# Patient Record
Sex: FEMALE | Race: WHITE | NOT HISPANIC OR LATINO | URBAN - METROPOLITAN AREA
[De-identification: names, ages, dates, MRNs, and addresses within clinical notes are randomized per-mention and may not be internally consistent; named-entity substitution may affect disease eponyms.]

---

## 2020-09-17 ENCOUNTER — EMERGENCY (EMERGENCY)
Facility: HOSPITAL | Age: 45
LOS: 0 days | Discharge: HOME | End: 2020-09-17
Attending: EMERGENCY MEDICINE
Payer: COMMERCIAL

## 2020-09-17 VITALS — WEIGHT: 139.99 LBS

## 2020-09-17 VITALS
RESPIRATION RATE: 17 BRPM | SYSTOLIC BLOOD PRESSURE: 111 MMHG | DIASTOLIC BLOOD PRESSURE: 78 MMHG | HEART RATE: 75 BPM | OXYGEN SATURATION: 100 %

## 2020-09-17 DIAGNOSIS — Z3A.19 19 WEEKS GESTATION OF PREGNANCY: ICD-10-CM

## 2020-09-17 DIAGNOSIS — R11.0 NAUSEA: ICD-10-CM

## 2020-09-17 DIAGNOSIS — O34.12 MATERNAL CARE FOR BENIGN TUMOR OF CORPUS UTERI, SECOND TRIMESTER: ICD-10-CM

## 2020-09-17 DIAGNOSIS — D25.9 LEIOMYOMA OF UTERUS, UNSPECIFIED: ICD-10-CM

## 2020-09-17 DIAGNOSIS — O20.0 THREATENED ABORTION: ICD-10-CM

## 2020-09-17 DIAGNOSIS — R10.9 UNSPECIFIED ABDOMINAL PAIN: ICD-10-CM

## 2020-09-17 LAB
ALBUMIN SERPL ELPH-MCNC: 3.4 G/DL — SIGNIFICANT CHANGE UP (ref 3.3–5)
ALP SERPL-CCNC: 54 U/L — SIGNIFICANT CHANGE UP (ref 40–120)
ALT FLD-CCNC: 19 U/L — SIGNIFICANT CHANGE UP (ref 12–78)
ANION GAP SERPL CALC-SCNC: 8 MMOL/L — SIGNIFICANT CHANGE UP (ref 5–17)
AST SERPL-CCNC: 12 U/L — LOW (ref 15–37)
BILIRUB SERPL-MCNC: 0.3 MG/DL — SIGNIFICANT CHANGE UP (ref 0.2–1.2)
BUN SERPL-MCNC: 11 MG/DL — SIGNIFICANT CHANGE UP (ref 7–23)
CALCIUM SERPL-MCNC: 9.1 MG/DL — SIGNIFICANT CHANGE UP (ref 8.5–10.1)
CHLORIDE SERPL-SCNC: 108 MMOL/L — SIGNIFICANT CHANGE UP (ref 96–108)
CO2 SERPL-SCNC: 23 MMOL/L — SIGNIFICANT CHANGE UP (ref 22–31)
CREAT SERPL-MCNC: 0.62 MG/DL — SIGNIFICANT CHANGE UP (ref 0.5–1.3)
GLUCOSE SERPL-MCNC: 77 MG/DL — SIGNIFICANT CHANGE UP (ref 70–99)
HCG SERPL-ACNC: HIGH MIU/ML
POTASSIUM SERPL-MCNC: 3.9 MMOL/L — SIGNIFICANT CHANGE UP (ref 3.5–5.3)
POTASSIUM SERPL-SCNC: 3.9 MMOL/L — SIGNIFICANT CHANGE UP (ref 3.5–5.3)
PROT SERPL-MCNC: 7.6 GM/DL — SIGNIFICANT CHANGE UP (ref 6–8.3)
SODIUM SERPL-SCNC: 139 MMOL/L — SIGNIFICANT CHANGE UP (ref 135–145)

## 2020-09-17 PROCEDURE — 85610 PROTHROMBIN TIME: CPT

## 2020-09-17 PROCEDURE — 81003 URINALYSIS AUTO W/O SCOPE: CPT

## 2020-09-17 PROCEDURE — 99284 EMERGENCY DEPT VISIT MOD MDM: CPT

## 2020-09-17 PROCEDURE — 85730 THROMBOPLASTIN TIME PARTIAL: CPT

## 2020-09-17 PROCEDURE — 80053 COMPREHEN METABOLIC PANEL: CPT

## 2020-09-17 PROCEDURE — 76805 OB US >/= 14 WKS SNGL FETUS: CPT | Mod: 26

## 2020-09-17 PROCEDURE — 85025 COMPLETE CBC W/AUTO DIFF WBC: CPT

## 2020-09-17 PROCEDURE — 36415 COLL VENOUS BLD VENIPUNCTURE: CPT

## 2020-09-17 PROCEDURE — 76805 OB US >/= 14 WKS SNGL FETUS: CPT

## 2020-09-17 PROCEDURE — 86901 BLOOD TYPING SEROLOGIC RH(D): CPT

## 2020-09-17 PROCEDURE — 84702 CHORIONIC GONADOTROPIN TEST: CPT

## 2020-09-17 PROCEDURE — 86900 BLOOD TYPING SEROLOGIC ABO: CPT

## 2020-09-17 PROCEDURE — 99284 EMERGENCY DEPT VISIT MOD MDM: CPT | Mod: 25

## 2020-09-17 PROCEDURE — 86850 RBC ANTIBODY SCREEN: CPT

## 2020-09-17 PROCEDURE — 87086 URINE CULTURE/COLONY COUNT: CPT

## 2020-09-17 NOTE — ED STATDOCS - NSFOLLOWUPINSTRUCTIONS_ED_ALL_ED_FT
[facility]                                                                      Threatened Miscarriage       A threatened miscarriage is when you have bleeding from your vagina during the first 20 weeks of pregnancy but the pregnancy does not end. Your doctor will do tests to make sure you are still pregnant. The cause of the bleeding may not be known. This condition does not mean your pregnancy will end, but it does increase the risk that it will end (complete miscarriage).      Follow these instructions at home:    •Get plenty of rest.      •If you have bleeding in your vagina, do not have sex or use tampons.      • Do not douche.      • Do not smoke or use drugs.      • Do not drink alcohol.      •Avoid caffeine.      •Keep all follow-up prenatal visits as told by your doctor. This is important.        Contact a doctor if:    •You have light bleeding from your vagina.      •You have belly pain or cramping.      •You have a fever.        Get help right away if:    •You have heavy bleeding from your vagina.      •You have clots of blood coming from your vagina.      •You pass tissue from your vagina.      •You have a gush of fluid from your vagina.      •You are leaking fluid from your vagina.      •You have very bad pain or cramps in your low back or belly.      •You have fever, chills, and very bad belly pain.        Summary    •A threatened miscarriage is when you have bleeding from your vagina during the first 20 weeks of pregnancy but the pregnancy does not end.      •This condition does not mean your pregnancy will end, but it does increase the risk that it will end (complete miscarriage).      •Get plenty of rest. If you have bleeding in your vagina, do not have sex or use tampons.      •Keep all follow-up prenatal visits as told by your doctor. This is important.      This information is not intended to replace advice given to you by your health care provider. Make sure you discuss any questions you have with your health care provider.      Document Released: 11/30/2009 Document Revised: 01/24/2019 Document Reviewed: 03/16/2018    Elsevier Patient Education © 2020 [employer] Inc[employer]

## 2020-09-17 NOTE — ED ADULT TRIAGE NOTE - CHIEF COMPLAINT QUOTE
pt c/o abdominal pain and nausea for the apst few days. pt states she did take a pregancy test this AM and it was positive. pt states last month she had a two day light period. pt ddenies fever fall tau vomit ting or diarrhea     pt states she did not plan or try to get pregnant

## 2020-09-17 NOTE — ED STATDOCS - PROGRESS NOTE DETAILS
Patient seen and evaluated in intake with ED Attending,  ED attending note and orders reviewed, will continue with patient follow up and care -Alexandrea Minor PA-C pt aware of US results and awaiting lab results, pt called her OB/GYN Dr. Clay to see her tomorrow. -Alexandrea Minor PA-C pt aware of US results and lab results, pt called her OB/GYN Dr. Clay to see her tomorrow. -Alexandrea Minor PA-C

## 2020-09-17 NOTE — ED ADULT NURSE NOTE - OBJECTIVE STATEMENT
pt. c/o umbilical pain, irregular periods and +pregnancy test x few days. pt. states hx of fibroid, last checked 7yrs ago. Pt. states periods are usually irregular in time and duration, but recently last two months periods have been short, only 2 days with lots of clots. Pt. denies dizziness, urinary symptoms, weakness, bleeding currently.

## 2020-09-17 NOTE — ED STATDOCS - CLINICAL SUMMARY MEDICAL DECISION MAKING FREE TEXT BOX
pt presented with c/c of abdominal pain and nausea took home preg test is positive. plan check labs, rh status. ua, us and fu with ob/gyn.

## 2020-09-17 NOTE — ED STATDOCS - CARE PLAN
Principal Discharge DX:	19 weeks gestation of pregnancy  Secondary Diagnosis:	Threatened miscarriage

## 2020-09-17 NOTE — ED STATDOCS - PATIENT PORTAL LINK FT
You can access the FollowMyHealth Patient Portal offered by [facility] by registering at the following website: [email or web address]. By joining [facility]’s FollowMyHealth portal, you will also be able to view your health information using other applications (apps) compatible with our system.

## 2020-09-17 NOTE — ED STATDOCS - CARE PROVIDER_API CALL
Kamala Vallejo)  Obstetrics and Gynecology  [address] [place] [address]  Phone: [phone]  Fax: [phone]  Follow Up Time:

## 2020-09-17 NOTE — ED STATDOCS - OBJECTIVE STATEMENT
44 y/o female with a PMHx of fibroids, h/o  presents to the ED c/o intermittent abd pain and nausea x days. Pt reports she took a pregnancy test yesterday, resulting positive. Pt reports having her period with larger clots than usual. Denies vomiting, constipation, diarrhea, urinary symptoms. No other complaints at this time.

## 2020-09-18 LAB
CULTURE RESULTS: SIGNIFICANT CHANGE UP
SPECIMEN SOURCE: SIGNIFICANT CHANGE UP

## 2020-09-21 PROBLEM — D21.9 BENIGN NEOPLASM OF CONNECTIVE AND OTHER SOFT TISSUE, UNSPECIFIED: Chronic | Status: ACTIVE | Noted: 2020-09-17

## 2020-09-24 ENCOUNTER — APPOINTMENT (OUTPATIENT)
Dept: OBGYN | Facility: CLINIC | Age: 45
End: 2020-09-24
Payer: COMMERCIAL

## 2020-09-24 VITALS
BODY MASS INDEX: 25.27 KG/M2 | HEIGHT: 64 IN | TEMPERATURE: 98.3 F | WEIGHT: 148 LBS | DIASTOLIC BLOOD PRESSURE: 80 MMHG | SYSTOLIC BLOOD PRESSURE: 120 MMHG

## 2020-09-24 DIAGNOSIS — N91.1 SECONDARY AMENORRHEA: ICD-10-CM

## 2020-09-24 DIAGNOSIS — F19.90 OTHER PSYCHOACTIVE SUBSTANCE USE, UNSPECIFIED, UNCOMPLICATED: ICD-10-CM

## 2020-09-24 DIAGNOSIS — Z80.8 FAMILY HISTORY OF MALIGNANT NEOPLASM OF OTHER ORGANS OR SYSTEMS: ICD-10-CM

## 2020-09-24 DIAGNOSIS — Z34.92 ENCOUNTER FOR SUPERVISION OF NORMAL PREGNANCY, UNSPECIFIED, SECOND TRIMESTER: ICD-10-CM

## 2020-09-24 DIAGNOSIS — Z78.9 OTHER SPECIFIED HEALTH STATUS: ICD-10-CM

## 2020-09-24 PROBLEM — Z00.00 ENCOUNTER FOR PREVENTIVE HEALTH EXAMINATION: Status: ACTIVE | Noted: 2020-09-24

## 2020-09-24 LAB
BILIRUB UR QL STRIP: NORMAL
CLARITY UR: CLEAR
COLLECTION METHOD: NORMAL
GLUCOSE UR-MCNC: NORMAL
HCG UR QL: 0.2 EU/DL
HGB UR QL STRIP.AUTO: NORMAL
KETONES UR-MCNC: NORMAL
LEUKOCYTE ESTERASE UR QL STRIP: NORMAL
NITRITE UR QL STRIP: NORMAL
PH UR STRIP: 6
PROT UR STRIP-MCNC: NORMAL
SP GR UR STRIP: 1.01

## 2020-09-24 PROCEDURE — 76816 OB US FOLLOW-UP PER FETUS: CPT

## 2020-09-24 PROCEDURE — 99204 OFFICE O/P NEW MOD 45 MIN: CPT

## 2020-09-24 PROCEDURE — 81003 URINALYSIS AUTO W/O SCOPE: CPT | Mod: QW

## 2020-09-24 NOTE — PHYSICAL EXAM
[Appropriately responsive] : appropriately responsive [Alert] : alert [No Acute Distress] : no acute distress [No Lymphadenopathy] : no lymphadenopathy [Regular Rate Rhythm] : regular rate rhythm [No Murmurs] : no murmurs [Clear to Auscultation B/L] : clear to auscultation bilaterally [Soft] : soft [Non-tender] : non-tender [Non-distended] : non-distended [No HSM] : No HSM [No Lesions] : no lesions [Oriented x3] : oriented x3 [FreeTextEntry7] : 20 week size uterus [Labia Majora] : normal [Labia Minora] : normal [Normal] : normal [Enlarged ___ wks] : enlarged [unfilled] ~Uweeks [Uterine Adnexae] : normal

## 2020-09-24 NOTE — COUNSELING
[Nutrition/ Exercise/ Weight Management] : nutrition, exercise, weight management [Pregnancy Options] : pregnancy options

## 2020-09-24 NOTE — PLAN
[FreeTextEntry1] : PT WITH ADVANCED MATERNAL AGE AND A 19 WEEK PREGNANCY. INFORMATION GIVEN. FINDINGS DISCUSSED. REVIEWED TESTING AND PRENATAL CARE OUTLINE

## 2020-09-24 NOTE — PROCEDURE
[Transabdominal OB Sonogram] : Transabdominal OB Sonogram [Intrauterine Pregnancy] : intrauterine pregnancy [Fetal Heart] : fetal heart present [Date: ___] : Date: [unfilled] [Current GA by Sonogram: ___ (wks)] : Current GA by Sonogram: [unfilled]Uwks [___ day(s)] : [unfilled] days [Transabdominal OB Sonogram WNL] : Transabdominal OB Sonogram WNL [FreeTextEntry1] : 19+ week gestation noted

## 2020-09-25 LAB
ABO + RH PNL BLD: NORMAL
APPEARANCE: CLEAR
BACTERIA: NEGATIVE
BASOPHILS # BLD AUTO: 0.02 K/UL
BASOPHILS NFR BLD AUTO: 0.3 %
BILIRUBIN URINE: NEGATIVE
BLD GP AB SCN SERPL QL: NORMAL
BLOOD URINE: NEGATIVE
COLOR: NORMAL
EOSINOPHIL # BLD AUTO: 0.16 K/UL
EOSINOPHIL NFR BLD AUTO: 2.1 %
GLUCOSE QUALITATIVE U: NEGATIVE
HCT VFR BLD CALC: 38 %
HGB A MFR BLD: 97.3 %
HGB A2 MFR BLD: 2.7 %
HGB BLD-MCNC: 12.8 G/DL
HGB FRACT BLD-IMP: NORMAL
HIV1+2 AB SPEC QL IA.RAPID: NONREACTIVE
HYALINE CASTS: 0 /LPF
IMM GRANULOCYTES NFR BLD AUTO: 0.1 %
KETONES URINE: ABNORMAL
LEUKOCYTE ESTERASE URINE: NEGATIVE
LYMPHOCYTES # BLD AUTO: 1.51 K/UL
LYMPHOCYTES NFR BLD AUTO: 20.2 %
MAN DIFF?: NORMAL
MCHC RBC-ENTMCNC: 32.7 PG
MCHC RBC-ENTMCNC: 33.7 GM/DL
MCV RBC AUTO: 97.2 FL
MICROSCOPIC-UA: NORMAL
MONOCYTES # BLD AUTO: 0.36 K/UL
MONOCYTES NFR BLD AUTO: 4.8 %
NEUTROPHILS # BLD AUTO: 5.4 K/UL
NEUTROPHILS NFR BLD AUTO: 72.5 %
NITRITE URINE: NEGATIVE
PH URINE: 6.5
PLATELET # BLD AUTO: 198 K/UL
PROTEIN URINE: NEGATIVE
RBC # BLD: 3.91 M/UL
RBC # FLD: 13.3 %
RED BLOOD CELLS URINE: 2 /HPF
SPECIFIC GRAVITY URINE: 1.01
SQUAMOUS EPITHELIAL CELLS: 2 /HPF
TSH SERPL-ACNC: 1.88 UIU/ML
UROBILINOGEN URINE: NORMAL
WBC # FLD AUTO: 7.46 K/UL
WHITE BLOOD CELLS URINE: 0 /HPF

## 2020-09-28 LAB
BACTERIA UR CULT: NORMAL
CMV IGG SERPL QL: <0.2 U/ML
CMV IGG SERPL-IMP: NEGATIVE
CMV IGM SERPL QL: <8 AU/ML
CMV IGM SERPL QL: NEGATIVE
HBV SURFACE AG SER QL: NONREACTIVE
HCV AB SER QL: NONREACTIVE
HCV S/CO RATIO: 0.13 S/CO
MEV IGG FLD QL IA: 53.9 AU/ML
MEV IGG+IGM SER-IMP: POSITIVE
RUBV IGG FLD-ACNC: 2.1 INDEX
RUBV IGG SER-IMP: POSITIVE
T GONDII AB SER-IMP: NEGATIVE
T GONDII AB SER-IMP: NEGATIVE
T GONDII IGG SER QL: <3 IU/ML
T GONDII IGM SER QL: <3 AU/ML
T PALLIDUM AB SER QL IA: NEGATIVE
VZV AB TITR SER: POSITIVE
VZV IGG SER IF-ACNC: 211.4 INDEX

## 2020-09-29 LAB
B19V IGG SER QL IA: 2.9 INDEX
B19V IGG+IGM SER-IMP: NORMAL
B19V IGG+IGM SER-IMP: POSITIVE
B19V IGM FLD-ACNC: 0.1 INDEX
B19V IGM SER-ACNC: NEGATIVE
RUBV IGM FLD-ACNC: 29.5 AU/ML

## 2020-10-01 LAB
AR GENE MUT ANL BLD/T: NORMAL
CFTR MUT TESTED BLD/T: NEGATIVE
CLARIM 15Q11.2: NORMAL
CLARIM 1P36: NORMAL
CLARIM 22Q11.2: NORMAL
CLARIM 4P-/WOLF-HIRSCHHORN: NORMAL
CLARIM 5P-/CRI DU CHAT: NORMAL
CLARIM ADDITIONAL INFO: NORMAL
CLARIM CHROMOSOME 13: NORMAL
CLARIM CHROMOSOME 18: NORMAL
CLARIM CHROMOSOME 21: NORMAL
CLARIM SEX CHROMOSOMES: NORMAL
CLARITEST NIPT W/MICRO: NORMAL
FMR1 GENE MUT ANL BLD/T: NORMAL

## 2020-10-05 ENCOUNTER — NON-APPOINTMENT (OUTPATIENT)
Age: 45
End: 2020-10-05

## 2020-10-05 ENCOUNTER — APPOINTMENT (OUTPATIENT)
Dept: ANTEPARTUM | Facility: CLINIC | Age: 45
End: 2020-10-05
Payer: COMMERCIAL

## 2020-10-05 ENCOUNTER — APPOINTMENT (OUTPATIENT)
Dept: OBGYN | Facility: CLINIC | Age: 45
End: 2020-10-05
Payer: COMMERCIAL

## 2020-10-05 ENCOUNTER — ASOB RESULT (OUTPATIENT)
Age: 45
End: 2020-10-05

## 2020-10-05 VITALS
SYSTOLIC BLOOD PRESSURE: 104 MMHG | WEIGHT: 152 LBS | HEIGHT: 64 IN | DIASTOLIC BLOOD PRESSURE: 62 MMHG | RESPIRATION RATE: 16 BRPM | TEMPERATURE: 97 F | BODY MASS INDEX: 25.95 KG/M2

## 2020-10-05 DIAGNOSIS — Z3A.21 21 WEEKS GESTATION OF PREGNANCY: ICD-10-CM

## 2020-10-05 PROCEDURE — 76811 OB US DETAILED SNGL FETUS: CPT

## 2020-10-05 PROCEDURE — 0502F SUBSEQUENT PRENATAL CARE: CPT

## 2020-11-02 ENCOUNTER — APPOINTMENT (OUTPATIENT)
Dept: OBGYN | Facility: CLINIC | Age: 45
End: 2020-11-02
Payer: COMMERCIAL

## 2020-11-02 ENCOUNTER — NON-APPOINTMENT (OUTPATIENT)
Age: 45
End: 2020-11-02

## 2020-11-02 VITALS
HEIGHT: 64 IN | RESPIRATION RATE: 16 BRPM | DIASTOLIC BLOOD PRESSURE: 68 MMHG | WEIGHT: 157 LBS | TEMPERATURE: 97.6 F | SYSTOLIC BLOOD PRESSURE: 108 MMHG | BODY MASS INDEX: 26.8 KG/M2

## 2020-11-02 DIAGNOSIS — Z3A.25 25 WEEKS GESTATION OF PREGNANCY: ICD-10-CM

## 2020-11-02 LAB
BILIRUB UR QL STRIP: NORMAL
CLARITY UR: CLEAR
COLLECTION METHOD: NORMAL
GLUCOSE UR-MCNC: NORMAL
HCG UR QL: 0.2 EU/DL
HGB UR QL STRIP.AUTO: NORMAL
KETONES UR-MCNC: NORMAL
LEUKOCYTE ESTERASE UR QL STRIP: NORMAL
NITRITE UR QL STRIP: NORMAL
PH UR STRIP: 6
PROT UR STRIP-MCNC: NORMAL
SP GR UR STRIP: 1.02

## 2020-11-02 PROCEDURE — 99072 ADDL SUPL MATRL&STAF TM PHE: CPT

## 2020-11-02 PROCEDURE — 90686 IIV4 VACC NO PRSV 0.5 ML IM: CPT

## 2020-11-02 PROCEDURE — 90471 IMMUNIZATION ADMIN: CPT

## 2020-11-02 PROCEDURE — 81003 URINALYSIS AUTO W/O SCOPE: CPT | Mod: QW

## 2020-11-02 PROCEDURE — 0502F SUBSEQUENT PRENATAL CARE: CPT | Mod: 25

## 2020-11-03 LAB
BASOPHILS # BLD AUTO: 0.03 K/UL
BASOPHILS NFR BLD AUTO: 0.4 %
EOSINOPHIL # BLD AUTO: 0.22 K/UL
EOSINOPHIL NFR BLD AUTO: 2.9 %
GLUCOSE 1H P 50 G GLC PO SERPL-MCNC: 129 MG/DL
HCT VFR BLD CALC: 38.5 %
HGB BLD-MCNC: 12.1 G/DL
IMM GRANULOCYTES NFR BLD AUTO: 0.3 %
LYMPHOCYTES # BLD AUTO: 1.59 K/UL
LYMPHOCYTES NFR BLD AUTO: 21.1 %
MAN DIFF?: NORMAL
MCHC RBC-ENTMCNC: 31.1 PG
MCHC RBC-ENTMCNC: 31.4 GM/DL
MCV RBC AUTO: 99 FL
MONOCYTES # BLD AUTO: 0.42 K/UL
MONOCYTES NFR BLD AUTO: 5.6 %
NEUTROPHILS # BLD AUTO: 5.25 K/UL
NEUTROPHILS NFR BLD AUTO: 69.7 %
PLATELET # BLD AUTO: 270 K/UL
RBC # BLD: 3.89 M/UL
RBC # FLD: 12.7 %
SARS-COV-2 IGG SERPL IA-ACNC: 0.08 INDEX
SARS-COV-2 IGG SERPL QL IA: NEGATIVE
WBC # FLD AUTO: 7.53 K/UL

## 2020-12-17 ENCOUNTER — NON-APPOINTMENT (OUTPATIENT)
Age: 45
End: 2020-12-17

## 2020-12-17 ENCOUNTER — APPOINTMENT (OUTPATIENT)
Dept: OBGYN | Facility: CLINIC | Age: 45
End: 2020-12-17
Payer: COMMERCIAL

## 2020-12-17 VITALS
SYSTOLIC BLOOD PRESSURE: 110 MMHG | BODY MASS INDEX: 28.17 KG/M2 | WEIGHT: 165 LBS | HEIGHT: 64 IN | DIASTOLIC BLOOD PRESSURE: 68 MMHG

## 2020-12-17 DIAGNOSIS — Z3A.31 31 WEEKS GESTATION OF PREGNANCY: ICD-10-CM

## 2020-12-17 LAB
BILIRUB UR QL STRIP: NORMAL
CLARITY UR: CLEAR
COLLECTION METHOD: NORMAL
GLUCOSE UR-MCNC: NORMAL
HCG UR QL: 0.2 EU/DL
HGB UR QL STRIP.AUTO: NORMAL
KETONES UR-MCNC: NORMAL
LEUKOCYTE ESTERASE UR QL STRIP: NORMAL
NITRITE UR QL STRIP: NORMAL
PH UR STRIP: 69
PROT UR STRIP-MCNC: NORMAL
SP GR UR STRIP: 1.02

## 2020-12-17 PROCEDURE — 0502F SUBSEQUENT PRENATAL CARE: CPT

## 2021-01-08 ENCOUNTER — APPOINTMENT (OUTPATIENT)
Dept: OBGYN | Facility: CLINIC | Age: 46
End: 2021-01-08
Payer: COMMERCIAL

## 2021-01-08 ENCOUNTER — NON-APPOINTMENT (OUTPATIENT)
Age: 46
End: 2021-01-08

## 2021-01-08 ENCOUNTER — APPOINTMENT (OUTPATIENT)
Dept: ANTEPARTUM | Facility: CLINIC | Age: 46
End: 2021-01-08
Payer: COMMERCIAL

## 2021-01-08 ENCOUNTER — ASOB RESULT (OUTPATIENT)
Age: 46
End: 2021-01-08

## 2021-01-08 VITALS
HEIGHT: 64 IN | RESPIRATION RATE: 16 BRPM | TEMPERATURE: 96.7 F | DIASTOLIC BLOOD PRESSURE: 64 MMHG | BODY MASS INDEX: 29.19 KG/M2 | WEIGHT: 171 LBS | SYSTOLIC BLOOD PRESSURE: 104 MMHG

## 2021-01-08 DIAGNOSIS — Z3A.34 34 WEEKS GESTATION OF PREGNANCY: ICD-10-CM

## 2021-01-08 LAB
BILIRUB UR QL STRIP: NORMAL
CLARITY UR: CLEAR
COLLECTION METHOD: NORMAL
GLUCOSE UR-MCNC: 100
HCG UR QL: 0.2 EU/DL
HGB UR QL STRIP.AUTO: NORMAL
KETONES UR-MCNC: NORMAL
LEUKOCYTE ESTERASE UR QL STRIP: NORMAL
NITRITE UR QL STRIP: NORMAL
PH UR STRIP: 6
PROT UR STRIP-MCNC: 30
SP GR UR STRIP: 1.03

## 2021-01-08 PROCEDURE — 99072 ADDL SUPL MATRL&STAF TM PHE: CPT

## 2021-01-08 PROCEDURE — 76819 FETAL BIOPHYS PROFIL W/O NST: CPT

## 2021-01-08 PROCEDURE — 81003 URINALYSIS AUTO W/O SCOPE: CPT | Mod: QW

## 2021-01-08 PROCEDURE — 0502F SUBSEQUENT PRENATAL CARE: CPT

## 2021-01-08 PROCEDURE — 90715 TDAP VACCINE 7 YRS/> IM: CPT

## 2021-01-08 PROCEDURE — 76816 OB US FOLLOW-UP PER FETUS: CPT

## 2021-01-08 PROCEDURE — 90471 IMMUNIZATION ADMIN: CPT

## 2021-01-08 PROCEDURE — 59025 FETAL NON-STRESS TEST: CPT

## 2021-01-08 PROCEDURE — 76821 MIDDLE CEREBRAL ARTERY ECHO: CPT | Mod: 59

## 2021-01-15 ENCOUNTER — ASOB RESULT (OUTPATIENT)
Age: 46
End: 2021-01-15

## 2021-01-15 ENCOUNTER — APPOINTMENT (OUTPATIENT)
Dept: OBGYN | Facility: CLINIC | Age: 46
End: 2021-01-15
Payer: COMMERCIAL

## 2021-01-15 ENCOUNTER — APPOINTMENT (OUTPATIENT)
Dept: ANTEPARTUM | Facility: CLINIC | Age: 46
End: 2021-01-15
Payer: COMMERCIAL

## 2021-01-15 ENCOUNTER — NON-APPOINTMENT (OUTPATIENT)
Age: 46
End: 2021-01-15

## 2021-01-15 VITALS
RESPIRATION RATE: 18 BRPM | OXYGEN SATURATION: 98 % | BODY MASS INDEX: 29.02 KG/M2 | DIASTOLIC BLOOD PRESSURE: 72 MMHG | HEIGHT: 64 IN | WEIGHT: 170 LBS | SYSTOLIC BLOOD PRESSURE: 108 MMHG

## 2021-01-15 DIAGNOSIS — Z34.93 ENCOUNTER FOR SUPERVISION OF NORMAL PREGNANCY, UNSPECIFIED, THIRD TRIMESTER: ICD-10-CM

## 2021-01-15 DIAGNOSIS — Z3A.35 35 WEEKS GESTATION OF PREGNANCY: ICD-10-CM

## 2021-01-15 DIAGNOSIS — O40.9XX0 POLYHYDRAMNIOS, UNSPECIFIED TRIMESTER, NOT APPLICABLE OR UNSPECIFIED: ICD-10-CM

## 2021-01-15 PROCEDURE — 59025 FETAL NON-STRESS TEST: CPT

## 2021-01-15 PROCEDURE — 36415 COLL VENOUS BLD VENIPUNCTURE: CPT

## 2021-01-15 PROCEDURE — 76821 MIDDLE CEREBRAL ARTERY ECHO: CPT | Mod: 59

## 2021-01-15 PROCEDURE — 76819 FETAL BIOPHYS PROFIL W/O NST: CPT

## 2021-01-15 PROCEDURE — 99072 ADDL SUPL MATRL&STAF TM PHE: CPT

## 2021-01-15 PROCEDURE — 0502F SUBSEQUENT PRENATAL CARE: CPT

## 2021-01-22 ENCOUNTER — NON-APPOINTMENT (OUTPATIENT)
Age: 46
End: 2021-01-22

## 2021-01-22 ENCOUNTER — APPOINTMENT (OUTPATIENT)
Dept: ANTEPARTUM | Facility: CLINIC | Age: 46
End: 2021-01-22
Payer: COMMERCIAL

## 2021-01-22 ENCOUNTER — APPOINTMENT (OUTPATIENT)
Dept: OBGYN | Facility: CLINIC | Age: 46
End: 2021-01-22
Payer: COMMERCIAL

## 2021-01-22 ENCOUNTER — ASOB RESULT (OUTPATIENT)
Age: 46
End: 2021-01-22

## 2021-01-22 VITALS
HEART RATE: 70 BPM | SYSTOLIC BLOOD PRESSURE: 106 MMHG | RESPIRATION RATE: 16 BRPM | DIASTOLIC BLOOD PRESSURE: 66 MMHG | WEIGHT: 170 LBS | OXYGEN SATURATION: 98 % | BODY MASS INDEX: 29.02 KG/M2 | HEIGHT: 64 IN

## 2021-01-22 DIAGNOSIS — Z3A.36 36 WEEKS GESTATION OF PREGNANCY: ICD-10-CM

## 2021-01-22 LAB
BASOPHILS # BLD AUTO: 0.01 K/UL
BASOPHILS NFR BLD AUTO: 0.1 %
EOSINOPHIL # BLD AUTO: 0.08 K/UL
EOSINOPHIL NFR BLD AUTO: 1 %
GP B STREP DNA SPEC QL NAA+PROBE: DETECTED
GP B STREP DNA SPEC QL NAA+PROBE: NORMAL
HCT VFR BLD CALC: 34 %
HGB BLD-MCNC: 10.4 G/DL
HIV1+2 AB SPEC QL IA.RAPID: NONREACTIVE
IMM GRANULOCYTES NFR BLD AUTO: 0.5 %
LYMPHOCYTES # BLD AUTO: 1.47 K/UL
LYMPHOCYTES NFR BLD AUTO: 17.8 %
MAN DIFF?: NORMAL
MCHC RBC-ENTMCNC: 27.7 PG
MCHC RBC-ENTMCNC: 30.6 GM/DL
MCV RBC AUTO: 90.4 FL
MONOCYTES # BLD AUTO: 0.53 K/UL
MONOCYTES NFR BLD AUTO: 6.4 %
NEUTROPHILS # BLD AUTO: 6.12 K/UL
NEUTROPHILS NFR BLD AUTO: 74.2 %
PLATELET # BLD AUTO: 200 K/UL
RBC # BLD: 3.76 M/UL
RBC # FLD: 13.4 %
SOURCE GBS: NORMAL
WBC # FLD AUTO: 8.25 K/UL

## 2021-01-22 PROCEDURE — 76819 FETAL BIOPHYS PROFIL W/O NST: CPT

## 2021-01-22 PROCEDURE — 99072 ADDL SUPL MATRL&STAF TM PHE: CPT

## 2021-01-22 PROCEDURE — 0502F SUBSEQUENT PRENATAL CARE: CPT

## 2021-01-22 PROCEDURE — 59025 FETAL NON-STRESS TEST: CPT

## 2021-02-01 ENCOUNTER — APPOINTMENT (OUTPATIENT)
Dept: ANTEPARTUM | Facility: CLINIC | Age: 46
End: 2021-02-01

## 2021-02-01 ENCOUNTER — APPOINTMENT (OUTPATIENT)
Dept: OBGYN | Facility: CLINIC | Age: 46
End: 2021-02-01

## 2021-02-04 ENCOUNTER — APPOINTMENT (OUTPATIENT)
Dept: OBGYN | Facility: CLINIC | Age: 46
End: 2021-02-04
Payer: COMMERCIAL

## 2021-02-04 ENCOUNTER — NON-APPOINTMENT (OUTPATIENT)
Age: 46
End: 2021-02-04

## 2021-02-04 VITALS — BODY MASS INDEX: 30.22 KG/M2 | RESPIRATION RATE: 16 BRPM | WEIGHT: 177 LBS | HEIGHT: 64 IN | TEMPERATURE: 97.4 F

## 2021-02-04 DIAGNOSIS — Z3A.38 38 WEEKS GESTATION OF PREGNANCY: ICD-10-CM

## 2021-02-04 PROCEDURE — 59025 FETAL NON-STRESS TEST: CPT

## 2021-02-04 PROCEDURE — 0502F SUBSEQUENT PRENATAL CARE: CPT

## 2021-02-05 DIAGNOSIS — Z01.818 ENCOUNTER FOR OTHER PREPROCEDURAL EXAMINATION: ICD-10-CM

## 2021-02-06 ENCOUNTER — APPOINTMENT (OUTPATIENT)
Dept: DISASTER EMERGENCY | Facility: CLINIC | Age: 46
End: 2021-02-06

## 2021-02-06 LAB — SARS-COV-2 N GENE NPH QL NAA+PROBE: NOT DETECTED

## 2021-02-07 ENCOUNTER — INPATIENT (INPATIENT)
Facility: HOSPITAL | Age: 46
LOS: 3 days | Discharge: HOME | End: 2021-02-11
Attending: OBSTETRICS & GYNECOLOGY | Admitting: OBSTETRICS & GYNECOLOGY
Payer: COMMERCIAL

## 2021-02-07 ENCOUNTER — RESULT REVIEW (OUTPATIENT)
Age: 46
End: 2021-02-07

## 2021-02-07 VITALS — WEIGHT: 176.37 LBS | HEIGHT: 64 IN

## 2021-02-07 DIAGNOSIS — O26.899 OTHER SPECIFIED PREGNANCY RELATED CONDITIONS, UNSPECIFIED TRIMESTER: ICD-10-CM

## 2021-02-07 LAB
AMNISURE ROM (RUPTURE OF MEMBRANES): NEGATIVE — SIGNIFICANT CHANGE UP
BASOPHILS # BLD AUTO: 0.02 K/UL — SIGNIFICANT CHANGE UP (ref 0–0.2)
BASOPHILS NFR BLD AUTO: 0.2 % — SIGNIFICANT CHANGE UP (ref 0–2)
EOSINOPHIL # BLD AUTO: 0.07 K/UL — SIGNIFICANT CHANGE UP (ref 0–0.5)
EOSINOPHIL NFR BLD AUTO: 0.7 % — SIGNIFICANT CHANGE UP (ref 0–6)
HCT VFR BLD CALC: 32.6 % — LOW (ref 34.5–45)
HGB BLD-MCNC: 10.5 G/DL — LOW (ref 11.5–15.5)
IMM GRANULOCYTES NFR BLD AUTO: 0.5 % — SIGNIFICANT CHANGE UP (ref 0–1.5)
LYMPHOCYTES # BLD AUTO: 1.71 K/UL — SIGNIFICANT CHANGE UP (ref 1–3.3)
LYMPHOCYTES # BLD AUTO: 17.6 % — SIGNIFICANT CHANGE UP (ref 13–44)
MCHC RBC-ENTMCNC: 26.3 PG — LOW (ref 27–34)
MCHC RBC-ENTMCNC: 32.2 GM/DL — SIGNIFICANT CHANGE UP (ref 32–36)
MCV RBC AUTO: 81.5 FL — SIGNIFICANT CHANGE UP (ref 80–100)
MONOCYTES # BLD AUTO: 0.61 K/UL — SIGNIFICANT CHANGE UP (ref 0–0.9)
MONOCYTES NFR BLD AUTO: 6.3 % — SIGNIFICANT CHANGE UP (ref 2–14)
NEUTROPHILS # BLD AUTO: 7.25 K/UL — SIGNIFICANT CHANGE UP (ref 1.8–7.4)
NEUTROPHILS NFR BLD AUTO: 74.7 % — SIGNIFICANT CHANGE UP (ref 43–77)
PLATELET # BLD AUTO: 200 K/UL — SIGNIFICANT CHANGE UP (ref 150–400)
RBC # BLD: 4 M/UL — SIGNIFICANT CHANGE UP (ref 3.8–5.2)
RBC # FLD: 14.2 % — SIGNIFICANT CHANGE UP (ref 10.3–14.5)
T PALLIDUM AB TITR SER: NEGATIVE — SIGNIFICANT CHANGE UP
WBC # BLD: 9.71 K/UL — SIGNIFICANT CHANGE UP (ref 3.8–10.5)
WBC # FLD AUTO: 9.71 K/UL — SIGNIFICANT CHANGE UP (ref 3.8–10.5)

## 2021-02-07 PROCEDURE — 59510 CESAREAN DELIVERY: CPT

## 2021-02-07 PROCEDURE — 85025 COMPLETE CBC W/AUTO DIFF WBC: CPT

## 2021-02-07 PROCEDURE — 86850 RBC ANTIBODY SCREEN: CPT

## 2021-02-07 PROCEDURE — 58611 LIGATE OVIDUCT(S) ADD-ON: CPT

## 2021-02-07 PROCEDURE — 59050 FETAL MONITOR W/REPORT: CPT

## 2021-02-07 PROCEDURE — 36415 COLL VENOUS BLD VENIPUNCTURE: CPT

## 2021-02-07 PROCEDURE — 88302 TISSUE EXAM BY PATHOLOGIST: CPT | Mod: 26

## 2021-02-07 PROCEDURE — 86901 BLOOD TYPING SEROLOGIC RH(D): CPT

## 2021-02-07 PROCEDURE — 85027 COMPLETE CBC AUTOMATED: CPT

## 2021-02-07 PROCEDURE — 88302 TISSUE EXAM BY PATHOLOGIST: CPT

## 2021-02-07 PROCEDURE — 86923 COMPATIBILITY TEST ELECTRIC: CPT

## 2021-02-07 PROCEDURE — 86780 TREPONEMA PALLIDUM: CPT

## 2021-02-07 PROCEDURE — 84112 EVAL AMNIOTIC FLUID PROTEIN: CPT

## 2021-02-07 PROCEDURE — 94760 N-INVAS EAR/PLS OXIMETRY 1: CPT

## 2021-02-07 PROCEDURE — G0463: CPT

## 2021-02-07 PROCEDURE — 86900 BLOOD TYPING SEROLOGIC ABO: CPT

## 2021-02-07 PROCEDURE — 86769 SARS-COV-2 COVID-19 ANTIBODY: CPT

## 2021-02-07 RX ORDER — KETOROLAC TROMETHAMINE 30 MG/ML
30 SYRINGE (ML) INJECTION EVERY 6 HOURS
Refills: 0 | Status: DISCONTINUED | OUTPATIENT
Start: 2021-02-07 | End: 2021-02-08

## 2021-02-07 RX ORDER — TETANUS TOXOID, REDUCED DIPHTHERIA TOXOID AND ACELLULAR PERTUSSIS VACCINE, ADSORBED 5; 2.5; 8; 8; 2.5 [IU]/.5ML; [IU]/.5ML; UG/.5ML; UG/.5ML; UG/.5ML
0.5 SUSPENSION INTRAMUSCULAR ONCE
Refills: 0 | Status: DISCONTINUED | OUTPATIENT
Start: 2021-02-07 | End: 2021-02-11

## 2021-02-07 RX ORDER — IBUPROFEN 200 MG
600 TABLET ORAL EVERY 6 HOURS
Refills: 0 | Status: COMPLETED | OUTPATIENT
Start: 2021-02-07 | End: 2022-01-06

## 2021-02-07 RX ORDER — LANOLIN
1 OINTMENT (GRAM) TOPICAL EVERY 6 HOURS
Refills: 0 | Status: DISCONTINUED | OUTPATIENT
Start: 2021-02-07 | End: 2021-02-11

## 2021-02-07 RX ORDER — ACETAMINOPHEN 500 MG
975 TABLET ORAL
Refills: 0 | Status: DISCONTINUED | OUTPATIENT
Start: 2021-02-07 | End: 2021-02-11

## 2021-02-07 RX ORDER — SODIUM CHLORIDE 9 MG/ML
1000 INJECTION, SOLUTION INTRAVENOUS
Refills: 0 | Status: DISCONTINUED | OUTPATIENT
Start: 2021-02-07 | End: 2021-02-07

## 2021-02-07 RX ORDER — FAMOTIDINE 10 MG/ML
20 INJECTION INTRAVENOUS ONCE
Refills: 0 | Status: COMPLETED | OUTPATIENT
Start: 2021-02-07 | End: 2021-02-07

## 2021-02-07 RX ORDER — FAMOTIDINE 10 MG/ML
20 INJECTION INTRAVENOUS ONCE
Refills: 0 | Status: DISCONTINUED | OUTPATIENT
Start: 2021-02-07 | End: 2021-02-07

## 2021-02-07 RX ORDER — OXYCODONE HYDROCHLORIDE 5 MG/1
5 TABLET ORAL ONCE
Refills: 0 | Status: DISCONTINUED | OUTPATIENT
Start: 2021-02-07 | End: 2021-02-11

## 2021-02-07 RX ORDER — OXYTOCIN 10 UNIT/ML
333.33 VIAL (ML) INJECTION
Qty: 20 | Refills: 0 | Status: DISCONTINUED | OUTPATIENT
Start: 2021-02-07 | End: 2021-02-11

## 2021-02-07 RX ORDER — OXYCODONE HYDROCHLORIDE 5 MG/1
5 TABLET ORAL
Refills: 0 | Status: COMPLETED | OUTPATIENT
Start: 2021-02-07 | End: 2021-02-14

## 2021-02-07 RX ORDER — SODIUM CHLORIDE 9 MG/ML
1000 INJECTION, SOLUTION INTRAVENOUS
Refills: 0 | Status: DISCONTINUED | OUTPATIENT
Start: 2021-02-07 | End: 2021-02-11

## 2021-02-07 RX ORDER — ACETAMINOPHEN 500 MG
1000 TABLET ORAL ONCE
Refills: 0 | Status: COMPLETED | OUTPATIENT
Start: 2021-02-07 | End: 2021-02-07

## 2021-02-07 RX ORDER — ONDANSETRON 8 MG/1
4 TABLET, FILM COATED ORAL ONCE
Refills: 0 | Status: COMPLETED | OUTPATIENT
Start: 2021-02-07 | End: 2021-02-07

## 2021-02-07 RX ORDER — CITRIC ACID/SODIUM CITRATE 300-500 MG
15 SOLUTION, ORAL ORAL EVERY 6 HOURS
Refills: 0 | Status: DISCONTINUED | OUTPATIENT
Start: 2021-02-07 | End: 2021-02-07

## 2021-02-07 RX ORDER — SODIUM CHLORIDE 9 MG/ML
1000 INJECTION, SOLUTION INTRAVENOUS ONCE
Refills: 0 | Status: COMPLETED | OUTPATIENT
Start: 2021-02-07 | End: 2021-02-07

## 2021-02-07 RX ORDER — MAGNESIUM HYDROXIDE 400 MG/1
30 TABLET, CHEWABLE ORAL
Refills: 0 | Status: DISCONTINUED | OUTPATIENT
Start: 2021-02-07 | End: 2021-02-11

## 2021-02-07 RX ORDER — CEFAZOLIN SODIUM 1 G
2000 VIAL (EA) INJECTION ONCE
Refills: 0 | Status: DISCONTINUED | OUTPATIENT
Start: 2021-02-07 | End: 2021-02-11

## 2021-02-07 RX ORDER — DIPHENHYDRAMINE HCL 50 MG
25 CAPSULE ORAL EVERY 6 HOURS
Refills: 0 | Status: DISCONTINUED | OUTPATIENT
Start: 2021-02-07 | End: 2021-02-11

## 2021-02-07 RX ORDER — OXYCODONE HYDROCHLORIDE 5 MG/1
5 TABLET ORAL
Refills: 0 | Status: DISCONTINUED | OUTPATIENT
Start: 2021-02-07 | End: 2021-02-11

## 2021-02-07 RX ORDER — SIMETHICONE 80 MG/1
80 TABLET, CHEWABLE ORAL EVERY 4 HOURS
Refills: 0 | Status: DISCONTINUED | OUTPATIENT
Start: 2021-02-07 | End: 2021-02-11

## 2021-02-07 RX ORDER — OXYCODONE HYDROCHLORIDE 5 MG/1
5 TABLET ORAL ONCE
Refills: 0 | Status: DISCONTINUED | OUTPATIENT
Start: 2021-02-07 | End: 2021-02-07

## 2021-02-07 RX ORDER — ENOXAPARIN SODIUM 100 MG/ML
40 INJECTION SUBCUTANEOUS EVERY 24 HOURS
Refills: 0 | Status: DISCONTINUED | OUTPATIENT
Start: 2021-02-07 | End: 2021-02-11

## 2021-02-07 RX ADMIN — FAMOTIDINE 20 MILLIGRAM(S): 10 INJECTION INTRAVENOUS at 11:18

## 2021-02-07 RX ADMIN — Medication 975 MILLIGRAM(S): at 18:29

## 2021-02-07 RX ADMIN — Medication 15 MILLILITER(S): at 11:19

## 2021-02-07 RX ADMIN — OXYCODONE HYDROCHLORIDE 5 MILLIGRAM(S): 5 TABLET ORAL at 15:40

## 2021-02-07 RX ADMIN — OXYCODONE HYDROCHLORIDE 5 MILLIGRAM(S): 5 TABLET ORAL at 20:13

## 2021-02-07 RX ADMIN — SODIUM CHLORIDE 2000 MILLILITER(S): 9 INJECTION, SOLUTION INTRAVENOUS at 11:19

## 2021-02-07 RX ADMIN — ONDANSETRON 4 MILLIGRAM(S): 8 TABLET, FILM COATED ORAL at 17:22

## 2021-02-07 RX ADMIN — OXYCODONE HYDROCHLORIDE 5 MILLIGRAM(S): 5 TABLET ORAL at 17:00

## 2021-02-07 RX ADMIN — Medication 400 MILLIGRAM(S): at 13:00

## 2021-02-07 RX ADMIN — Medication 30 MILLIGRAM(S): at 15:41

## 2021-02-07 RX ADMIN — Medication 30 MILLIGRAM(S): at 21:28

## 2021-02-07 RX ADMIN — Medication 1000 MILLIUNIT(S)/MIN: at 13:01

## 2021-02-07 RX ADMIN — ENOXAPARIN SODIUM 40 MILLIGRAM(S): 100 INJECTION SUBCUTANEOUS at 21:28

## 2021-02-07 NOTE — PATIENT PROFILE OB - POST PARTUM DEPRESSION SCREEN OB 5
Pt verbalized discharge instructions. Pt informed to go to ER if develop chest pain, shortness of breath or abdominal pain. Pt ambulatory out in stable condition. Assessment unchanged.        Burt Weaver RN  10/20/20 1002
no

## 2021-02-08 ENCOUNTER — APPOINTMENT (OUTPATIENT)
Dept: ANTEPARTUM | Facility: CLINIC | Age: 46
End: 2021-02-08

## 2021-02-08 LAB
BASOPHILS # BLD AUTO: 0.03 K/UL — SIGNIFICANT CHANGE UP (ref 0–0.2)
BASOPHILS NFR BLD AUTO: 0.4 % — SIGNIFICANT CHANGE UP (ref 0–2)
EOSINOPHIL # BLD AUTO: 0.07 K/UL — SIGNIFICANT CHANGE UP (ref 0–0.5)
EOSINOPHIL NFR BLD AUTO: 0.8 % — SIGNIFICANT CHANGE UP (ref 0–6)
HCT VFR BLD CALC: 24.6 % — LOW (ref 34.5–45)
HGB BLD-MCNC: 7.9 G/DL — LOW (ref 11.5–15.5)
IMM GRANULOCYTES NFR BLD AUTO: 0.4 % — SIGNIFICANT CHANGE UP (ref 0–1.5)
LYMPHOCYTES # BLD AUTO: 1.2 K/UL — SIGNIFICANT CHANGE UP (ref 1–3.3)
LYMPHOCYTES # BLD AUTO: 14.5 % — SIGNIFICANT CHANGE UP (ref 13–44)
MCHC RBC-ENTMCNC: 26.3 PG — LOW (ref 27–34)
MCHC RBC-ENTMCNC: 32.1 GM/DL — SIGNIFICANT CHANGE UP (ref 32–36)
MCV RBC AUTO: 82 FL — SIGNIFICANT CHANGE UP (ref 80–100)
MONOCYTES # BLD AUTO: 0.55 K/UL — SIGNIFICANT CHANGE UP (ref 0–0.9)
MONOCYTES NFR BLD AUTO: 6.7 % — SIGNIFICANT CHANGE UP (ref 2–14)
NEUTROPHILS # BLD AUTO: 6.38 K/UL — SIGNIFICANT CHANGE UP (ref 1.8–7.4)
NEUTROPHILS NFR BLD AUTO: 77.2 % — HIGH (ref 43–77)
PLATELET # BLD AUTO: 157 K/UL — SIGNIFICANT CHANGE UP (ref 150–400)
RBC # BLD: 3 M/UL — LOW (ref 3.8–5.2)
RBC # FLD: 14.4 % — SIGNIFICANT CHANGE UP (ref 10.3–14.5)
SARS-COV-2 IGG SERPL QL IA: NEGATIVE — SIGNIFICANT CHANGE UP
SARS-COV-2 IGM SERPL IA-ACNC: 0.08 INDEX — SIGNIFICANT CHANGE UP
WBC # BLD: 8.26 K/UL — SIGNIFICANT CHANGE UP (ref 3.8–10.5)
WBC # FLD AUTO: 8.26 K/UL — SIGNIFICANT CHANGE UP (ref 3.8–10.5)

## 2021-02-08 RX ORDER — IBUPROFEN 200 MG
600 TABLET ORAL EVERY 6 HOURS
Refills: 0 | Status: DISCONTINUED | OUTPATIENT
Start: 2021-02-08 | End: 2021-02-11

## 2021-02-08 RX ADMIN — SIMETHICONE 80 MILLIGRAM(S): 80 TABLET, CHEWABLE ORAL at 13:53

## 2021-02-08 RX ADMIN — ENOXAPARIN SODIUM 40 MILLIGRAM(S): 100 INJECTION SUBCUTANEOUS at 22:21

## 2021-02-08 RX ADMIN — Medication 30 MILLIGRAM(S): at 02:36

## 2021-02-08 RX ADMIN — Medication 975 MILLIGRAM(S): at 10:56

## 2021-02-08 RX ADMIN — Medication 600 MILLIGRAM(S): at 18:01

## 2021-02-08 RX ADMIN — Medication 975 MILLIGRAM(S): at 21:13

## 2021-02-08 RX ADMIN — OXYCODONE HYDROCHLORIDE 5 MILLIGRAM(S): 5 TABLET ORAL at 08:53

## 2021-02-08 RX ADMIN — Medication 975 MILLIGRAM(S): at 13:53

## 2021-02-08 RX ADMIN — SIMETHICONE 80 MILLIGRAM(S): 80 TABLET, CHEWABLE ORAL at 08:53

## 2021-02-08 RX ADMIN — OXYCODONE HYDROCHLORIDE 5 MILLIGRAM(S): 5 TABLET ORAL at 18:01

## 2021-02-08 RX ADMIN — Medication 975 MILLIGRAM(S): at 00:24

## 2021-02-08 RX ADMIN — Medication 30 MILLIGRAM(S): at 10:56

## 2021-02-08 RX ADMIN — Medication 975 MILLIGRAM(S): at 05:47

## 2021-02-08 RX ADMIN — OXYCODONE HYDROCHLORIDE 5 MILLIGRAM(S): 5 TABLET ORAL at 13:53

## 2021-02-08 NOTE — PROGRESS NOTE ADULT - ATTENDING COMMENTS
Patient seen and examined at bedside. Agree with above assessment and exam. Doing well. Hemodynamically stable. Postop Hb 7.9 from 10.5. Patient asymptomatic. Plan will be to repeat CBC tomorrow am. Patient aware and agreed.

## 2021-02-08 NOTE — PROGRESS NOTE ADULT - SUBJECTIVE AND OBJECTIVE BOX
46y year old  now POD#1 s/p repeat  section with bilateral salpingectomy at 39wks gestation, uncomplicated intraoperative course.      No acute overnight events.   Pain well controlled on PRN medications.   Patient is not ambulating yet, Macdonald taken out within the last 2hrs, no urge to void yet, -flatus, -BM  Reports minimal lochia.   Denies dizziness, lightheadedness, SOB, palpitations, or fatigue at rest.   Denies fevers, chills, malaise, fatigue, and myalgia.     VS:   Vital Signs Last 24 Hrs  T(C): 37.1 (2021 04:15), Max: 37.1 (2021 04:15)  T(F): 98.7 (2021 04:15), Max: 98.7 (2021 04:15)  HR: 68 (2021 04:15) (58 - 96)  BP: 97/56 (2021 04:15) (94/57 - 135/68)  RR: 18 (2021 04:15) (17 - 23)  SpO2: 97% (2021 04:15) (96% - 100%)    Physical Exam:  General: NAD  Cardio: RRR  Lungs: CTAB   Abdomen: soft, ND, firm fundus palpated at the umbilicus. HARRIET dressing in place, minimal blood (quarter size) on dressing  Ext: nontender lower extremities, bilaterally.     Labs: AM labs pending                        10.5   9.71  )-----------( 200      ( 2021 10:34 )             32.6                46y year old  now POD#1 s/p repeat  section with bilateral salpingectomy at 39wks gestation, uncomplicated intraoperative course.      No acute overnight events.   Pain well controlled on PRN medications.   Patient is not ambulating yet, Macdonald taken out within the last 2hrs, no urge to void yet, -flatus, -BM  Reports minimal lochia.   Denies dizziness, lightheadedness, SOB, palpitations, or fatigue at rest.   Denies fevers, chills, malaise, fatigue, and myalgia.     VS:   Vital Signs Last 24 Hrs  T(C): 37.1 (2021 04:15), Max: 37.1 (2021 04:15)  T(F): 98.7 (2021 04:15), Max: 98.7 (2021 04:15)  HR: 68 (2021 04:15) (58 - 96)  BP: 97/56 (2021 04:15) (94/57 - 135/68)  RR: 18 (2021 04:15) (17 - 23)  SpO2: 97% (2021 04:15) (96% - 100%)    Physical Exam:  General: NAD  Cardio: RRR  Lungs: CTAB   Abdomen: soft, ND, firm fundus palpated at the umbilicus. HARRIET dressing in place, minimal blood (quarter size) on dressing  Pelvic: normal lochia  Ext: nontender lower extremities, bilaterally.     Labs: AM labs pending                        10.5   9.71  )-----------( 200      ( 2021 10:34 )             32.6

## 2021-02-09 ENCOUNTER — APPOINTMENT (OUTPATIENT)
Dept: OBGYN | Facility: HOSPITAL | Age: 46
End: 2021-02-09

## 2021-02-09 LAB
HCT VFR BLD CALC: 24.2 % — LOW (ref 34.5–45)
HGB BLD-MCNC: 7.7 G/DL — LOW (ref 11.5–15.5)
MCHC RBC-ENTMCNC: 26.4 PG — LOW (ref 27–34)
MCHC RBC-ENTMCNC: 31.8 GM/DL — LOW (ref 32–36)
MCV RBC AUTO: 82.9 FL — SIGNIFICANT CHANGE UP (ref 80–100)
PLATELET # BLD AUTO: 186 K/UL — SIGNIFICANT CHANGE UP (ref 150–400)
RBC # BLD: 2.92 M/UL — LOW (ref 3.8–5.2)
RBC # FLD: 14.5 % — SIGNIFICANT CHANGE UP (ref 10.3–14.5)
WBC # BLD: 9.4 K/UL — SIGNIFICANT CHANGE UP (ref 3.8–10.5)
WBC # FLD AUTO: 9.4 K/UL — SIGNIFICANT CHANGE UP (ref 3.8–10.5)

## 2021-02-09 RX ORDER — ASCORBIC ACID 60 MG
500 TABLET,CHEWABLE ORAL DAILY
Refills: 0 | Status: DISCONTINUED | OUTPATIENT
Start: 2021-02-09 | End: 2021-02-11

## 2021-02-09 RX ORDER — FERROUS SULFATE 325(65) MG
325 TABLET ORAL DAILY
Refills: 0 | Status: DISCONTINUED | OUTPATIENT
Start: 2021-02-09 | End: 2021-02-11

## 2021-02-09 RX ADMIN — Medication 600 MILLIGRAM(S): at 06:07

## 2021-02-09 RX ADMIN — Medication 325 MILLIGRAM(S): at 11:51

## 2021-02-09 RX ADMIN — SIMETHICONE 80 MILLIGRAM(S): 80 TABLET, CHEWABLE ORAL at 18:23

## 2021-02-09 RX ADMIN — Medication 500 MILLIGRAM(S): at 11:52

## 2021-02-09 RX ADMIN — Medication 975 MILLIGRAM(S): at 02:55

## 2021-02-09 RX ADMIN — Medication 975 MILLIGRAM(S): at 08:53

## 2021-02-09 RX ADMIN — MAGNESIUM HYDROXIDE 30 MILLILITER(S): 400 TABLET, CHEWABLE ORAL at 18:18

## 2021-02-09 RX ADMIN — Medication 600 MILLIGRAM(S): at 18:17

## 2021-02-09 RX ADMIN — Medication 600 MILLIGRAM(S): at 11:49

## 2021-02-09 RX ADMIN — SIMETHICONE 80 MILLIGRAM(S): 80 TABLET, CHEWABLE ORAL at 00:03

## 2021-02-09 RX ADMIN — Medication 975 MILLIGRAM(S): at 21:27

## 2021-02-09 RX ADMIN — Medication 600 MILLIGRAM(S): at 00:03

## 2021-02-09 RX ADMIN — Medication 975 MILLIGRAM(S): at 14:46

## 2021-02-09 RX ADMIN — MAGNESIUM HYDROXIDE 30 MILLILITER(S): 400 TABLET, CHEWABLE ORAL at 06:21

## 2021-02-09 RX ADMIN — ENOXAPARIN SODIUM 40 MILLIGRAM(S): 100 INJECTION SUBCUTANEOUS at 22:20

## 2021-02-09 NOTE — PROGRESS NOTE ADULT - SUBJECTIVE AND OBJECTIVE BOX
46y year old  now POD#2 s/p repeat  section and bilateral salpingectomy at 39wks gestation.  Uncomplicated intraoperative course.      No acute overnight events.   Pain well controlled with PRN medications.   Patient is ambulating, +voiding, +Flatus, -BM  Reports minimal lochia.   +breast feeding, -breast tenderness  Denies fevers, chills, malaise, fatigue, and myalgia.   Denies dizziness, lightheadedness, SOB, palpitations, or fatigue at rest or while ambulating.     VS:   Vital Signs Last 24 Hrs  T(C): 37.1 (2021 06:25), Max: 37.2 (2021 00:00)  T(F): 98.7 (2021 06:25), Max: 98.9 (2021 00:00)  HR: 96 (2021 06:25) (79 - 108)  BP: 112/77 (2021 06:25) (106/64 - 120/66)  RR: 18 (2021 06:25) (17 - 18)  SpO2: 100% (2021 06:25) (97% - 100%)    Physical Exam:  General: NAD  Cardio: RRR  Lungs: CTAB   Abdomen: soft, ND, firm fundus palpated at the umbilicus. HARRIET dressing in place, minimal old blood, no drainage.   Ext: nontender lower extremities, bilaterally.     Labs:                        7.9    8.26  )-----------( 157      ( 2021 08:07 )             24.6

## 2021-02-10 RX ADMIN — SIMETHICONE 80 MILLIGRAM(S): 80 TABLET, CHEWABLE ORAL at 21:15

## 2021-02-10 RX ADMIN — Medication 600 MILLIGRAM(S): at 11:40

## 2021-02-10 RX ADMIN — Medication 500 MILLIGRAM(S): at 09:41

## 2021-02-10 RX ADMIN — SIMETHICONE 80 MILLIGRAM(S): 80 TABLET, CHEWABLE ORAL at 13:54

## 2021-02-10 RX ADMIN — SIMETHICONE 80 MILLIGRAM(S): 80 TABLET, CHEWABLE ORAL at 09:26

## 2021-02-10 RX ADMIN — Medication 10 MILLIGRAM(S): at 21:51

## 2021-02-10 RX ADMIN — OXYCODONE HYDROCHLORIDE 5 MILLIGRAM(S): 5 TABLET ORAL at 17:15

## 2021-02-10 RX ADMIN — SIMETHICONE 80 MILLIGRAM(S): 80 TABLET, CHEWABLE ORAL at 17:55

## 2021-02-10 RX ADMIN — Medication 600 MILLIGRAM(S): at 05:58

## 2021-02-10 RX ADMIN — Medication 325 MILLIGRAM(S): at 09:19

## 2021-02-10 RX ADMIN — MAGNESIUM HYDROXIDE 30 MILLILITER(S): 400 TABLET, CHEWABLE ORAL at 09:18

## 2021-02-10 RX ADMIN — Medication 600 MILLIGRAM(S): at 00:05

## 2021-02-10 RX ADMIN — Medication 975 MILLIGRAM(S): at 09:25

## 2021-02-10 RX ADMIN — Medication 975 MILLIGRAM(S): at 03:00

## 2021-02-10 RX ADMIN — Medication 600 MILLIGRAM(S): at 17:54

## 2021-02-10 RX ADMIN — Medication 975 MILLIGRAM(S): at 21:15

## 2021-02-10 RX ADMIN — Medication 975 MILLIGRAM(S): at 14:54

## 2021-02-10 RX ADMIN — ENOXAPARIN SODIUM 40 MILLIGRAM(S): 100 INJECTION SUBCUTANEOUS at 11:39

## 2021-02-10 NOTE — PROGRESS NOTE ADULT - SUBJECTIVE AND OBJECTIVE BOX
POD 3 REPEAT C/S   PT WITH DISTENSION, PASSING FLATUS IRREGULARLY  Vital Signs Last 24 Hrs  T(C): 36.7 (10 Feb 2021 08:58), Max: 36.9 (09 Feb 2021 20:20)  T(F): 98.1 (10 Feb 2021 08:58), Max: 98.4 (09 Feb 2021 20:20)  HR: 97 (10 Feb 2021 08:58) (93 - 98)  BP: 122/75 (10 Feb 2021 08:58) (122/75 - 129/83)  BP(mean): --  RR: 17 (10 Feb 2021 08:58) (17 - 18)  SpO2: 100% (10 Feb 2021 08:58) (97% - 100%)  BREASTS + BF  ABDOMEN: DISTENDED POSITIVE BS  INCISION BANDAGED AND INTACT  EXT -C/C/E                        7.7    9.40  )-----------( 186      ( 09 Feb 2021 08:16 )             24.2   PT WITH LIKELY ILEUS  WILL GIVE MA DRIP AND CHANGE DIET  DISCUSSED WITH PATIENT AND STAFF  CARE PLAN DISCUSSED

## 2021-02-11 ENCOUNTER — TRANSCRIPTION ENCOUNTER (OUTPATIENT)
Age: 46
End: 2021-02-11

## 2021-02-11 VITALS
DIASTOLIC BLOOD PRESSURE: 70 MMHG | HEART RATE: 70 BPM | OXYGEN SATURATION: 99 % | TEMPERATURE: 98 F | SYSTOLIC BLOOD PRESSURE: 110 MMHG | RESPIRATION RATE: 17 BRPM

## 2021-02-11 RX ORDER — FERROUS SULFATE 325(65) MG
1 TABLET ORAL
Qty: 30 | Refills: 0
Start: 2021-02-11 | End: 2021-03-12

## 2021-02-11 RX ORDER — POLYETHYLENE GLYCOL 3350 17 G/17G
1 POWDER, FOR SOLUTION ORAL
Qty: 15 | Refills: 0
Start: 2021-02-11 | End: 2021-02-25

## 2021-02-11 RX ORDER — ASCORBIC ACID 60 MG
1 TABLET,CHEWABLE ORAL
Qty: 30 | Refills: 0
Start: 2021-02-11 | End: 2021-03-12

## 2021-02-11 RX ORDER — ACETAMINOPHEN 500 MG
2 TABLET ORAL
Qty: 80 | Refills: 0
Start: 2021-02-11 | End: 2021-02-20

## 2021-02-11 RX ORDER — OXYCODONE HYDROCHLORIDE 5 MG/1
1 TABLET ORAL
Qty: 12 | Refills: 0
Start: 2021-02-11

## 2021-02-11 RX ORDER — IBUPROFEN 200 MG
1 TABLET ORAL
Qty: 60 | Refills: 0
Start: 2021-02-11 | End: 2021-02-25

## 2021-02-11 RX ADMIN — SIMETHICONE 80 MILLIGRAM(S): 80 TABLET, CHEWABLE ORAL at 00:29

## 2021-02-11 RX ADMIN — Medication 600 MILLIGRAM(S): at 06:29

## 2021-02-11 RX ADMIN — Medication 975 MILLIGRAM(S): at 09:41

## 2021-02-11 RX ADMIN — Medication 975 MILLIGRAM(S): at 03:14

## 2021-02-11 RX ADMIN — SIMETHICONE 80 MILLIGRAM(S): 80 TABLET, CHEWABLE ORAL at 06:29

## 2021-02-11 RX ADMIN — Medication 500 MILLIGRAM(S): at 11:06

## 2021-02-11 RX ADMIN — SIMETHICONE 80 MILLIGRAM(S): 80 TABLET, CHEWABLE ORAL at 03:16

## 2021-02-11 RX ADMIN — Medication 600 MILLIGRAM(S): at 00:28

## 2021-02-11 RX ADMIN — Medication 325 MILLIGRAM(S): at 09:43

## 2021-02-11 NOTE — DISCHARGE NOTE OB - CARE PROVIDER_API CALL
Efra Rivera  OBSTETRICS AND GYNECOLOGY  [address] [facility], [address] [place] [address]  Phone: [phone]  Fax: [phone]  Follow Up Time:    Mg Gongora)  Obstetrics and Gynecology  [address] [place] [id number]  Phone: [phone]  Fax: [phone]  Follow Up Time:

## 2021-02-11 NOTE — DISCHARGE NOTE OB - HOSPITAL COURSE
30 y/o  now POD#3 s/p primary  section. Post operative allergic reaction to Toradol that resolved with medications and all NSAID's and ASA were discontinued. Patient transferred to post partum unit, hospital course significant for post partum hypertension now well controlled on medications that she will continue at home. At the time of discharge patient was tolerating regular diet PO, ambulating, voiding, and having bowel movements and flatus. Pain well controlled with pain medications PRN.  45 y/o  now POD#4 s/p repeat  section with bilateral salpingectomy. Patient transferred to post partum unit, uncomplicated hospital course. At the time of discharge patient was tolerating regular diet PO, ambulating, voiding, and having bowel movements and flatus. Pain well controlled with pain medications PRN.

## 2021-02-11 NOTE — PROGRESS NOTE ADULT - SUBJECTIVE AND OBJECTIVE BOX
46y year old  now POD#4 s/p repeat  section and bilateral salpingectomy at 39wks gestation.  Uncomplicated intraoperative course.   Post partum course significant for acute blood loss anemia, asymptomatic.      No acute overnight events.   Pain well controlled with PRN medications.   Patient is ambulating, +voiding, +Flatus, +BM, with improved bloating compared to yesterday   Reports minimal lochia.   +breast feeding, -breast tenderness  Denies fevers, chills, malaise, fatigue, and myalgia.   Denies dizziness, lightheadedness, SOB, palpitations, or fatigue at rest or while ambulating.     VS:   Vital Signs Last 24 Hrs  T(C): 36.8 (10 Feb 2021 23:35), Max: 36.8 (10 Feb 2021 23:35)  T(F): 98.3 (10 Feb 2021 23:35), Max: 98.3 (10 Feb 2021 23:35)  HR: 88 (10 Feb 2021 23:35) (76 - 97)  BP: 112/74 (10 Feb 2021 23:35) (112/74 - 124/81)  RR: 17 (10 Feb 2021 23:35) (17 - 17)  SpO2: 97% (10 Feb 2021 23:35) (97% - 100%)    Physical Exam:  General: NAD  Cardio: RRR  Lungs: CTAB   Abdomen: soft, ND, firm fundus palpated at the umbilicus. HARRIET dressing in place, minimal old blood, no drainage.   Ext: nontender lower extremities, bilaterally.     Labs:                                   7.7    9.40  )-----------( 186      ( 2021 08:16 )             24.2

## 2021-02-11 NOTE — PROGRESS NOTE ADULT - SUBJECTIVE AND OBJECTIVE BOX
Postpartum Note,  Section  She is a  46y woman who is now post-operative day: #4    Subjective:  The patient feels well.  She is ambulating without difficulty.  She is tolerating PO.  She is voiding.  She denies nausea and vomiting.  Her pain is controlled.  She reports normal postpartum bleeding  She is breastfeeding.  She is formula feeding.    Physical exam:    Vital Signs Last 24 Hrs  T(C): 36.9 (2021 09:08), Max: 36.9 (2021 09:08)  T(F): 98.4 (2021 09:08), Max: 98.4 (2021 09:08)  HR: 70 (2021 09:08) (70 - 88)  BP: 110/70 (2021 09:08) (110/70 - 124/81)  BP(mean): --  RR: 17 (2021 09:08) (17 - 17)  SpO2: 99% (2021 09:08) (97% - 99%)    Gen: NAD  Breast: Soft, nontender, non engorged  Abdomen: Soft, nontender, no distension , firm uterine fundus at umbilicus.  Incision: Clean, dry, and intact with steri strips  Pelvic: Normal lochia noted  Ext: No calf tenderness    LABS:    blood type  Rubella status:     Allergies    No Known Allergies    Intolerances      MEDICATIONS  (STANDING):  acetaminophen   Tablet .. 975 milliGRAM(s) Oral <User Schedule>  ascorbic acid 500 milliGRAM(s) Oral daily  bisacodyl Suppository 10 milliGRAM(s) Rectal at bedtime  ceFAZolin   IVPB 2000 milliGRAM(s) IV Intermittent once  diphtheria/tetanus/pertussis (acellular) Vaccine (ADAcel) 0.5 milliLiter(s) IntraMuscular once  enoxaparin Injectable 40 milliGRAM(s) SubCutaneous every 24 hours  ferrous    sulfate 325 milliGRAM(s) Oral daily  ibuprofen  Tablet. 600 milliGRAM(s) Oral every 6 hours  lactated ringers. 1000 milliLiter(s) (125 mL/Hr) IV Continuous <Continuous>  oxytocin Infusion 333.333 milliUNIT(s)/Min (1000 mL/Hr) IV Continuous <Continuous>  oxytocin Infusion 333.333 milliUNIT(s)/Min (1000 mL/Hr) IV Continuous <Continuous>    MEDICATIONS  (PRN):  diphenhydrAMINE 25 milliGRAM(s) Oral every 6 hours PRN Pruritus  lanolin Ointment 1 Application(s) Topical every 6 hours PRN Sore Nipples  magnesium hydroxide Suspension 30 milliLiter(s) Oral two times a day PRN Constipation  oxyCODONE    IR 5 milliGRAM(s) Oral every 3 hours PRN Moderate to Severe Pain (4-10)  oxyCODONE    IR 5 milliGRAM(s) Oral once PRN Moderate to Severe Pain (4-10)  simethicone 80 milliGRAM(s) Chew every 4 hours PRN Gas        Assessment and Plan  POD # 4 s/p c/s  Doing well.  Encourage ambulation.  Discharge home today.  Prescriptions for percocet and motrin electronically sent to the pharmacy.  F/U in 2 weeks for incision check.  Call for fevers, chills, nausea, vomiting, heavy vaginal bleeding, vaginal discharge, severe pain, symptoms of depression, problems with incision or any other concerning symptoms.  Nothing in vagina and no heavy lifting x 6 weeks.  No driving x 2 weeks.

## 2021-02-11 NOTE — DISCHARGE NOTE OB - MEDICATION SUMMARY - MEDICATIONS TO TAKE
I will START or STAY ON the medications listed below when I get home from the hospital:    acetaminophen 325 mg oral tablet  -- 2 tab(s) by mouth every 6 hours, As Needed -for moderate pain   -- Indication: For moderate pain    ibuprofen 600 mg oral tablet  -- 1 tab(s) by mouth every 6 hours, As Needed -for moderate pain   -- Indication: For moderate pain    oxyCODONE 5 mg oral tablet  -- 1 tab(s) by mouth every 6 hours, As Needed -for severe pain MDD:4  -- Caution federal law prohibits the transfer of this drug to any person other  than the person for whom it was prescribed.  It is very important that you take or use this exactly as directed.  Do not skip doses or discontinue unless directed by your doctor.  May cause drowsiness or dizziness.  This prescription cannot be refilled.  Using more of this medication than prescribed may cause serious breathing problems.    -- Indication: For severe pain    ferrous sulfate 325 mg (65 mg elemental iron) oral tablet  -- 1 tab(s) by mouth once a day  -- Indication: For acute blood loss anemia    MiraLax oral powder for reconstitution  -- 1 gram(s) by mouth once a day, As Needed -for constipation   -- Dilute this medication with liquid before administration.  It is very important that you take or use this exactly as directed.  Do not skip doses or discontinue unless directed by your doctor.    -- Indication: For Constipation    ascorbic acid 500 mg oral tablet  -- 1 tab(s) by mouth once a day  -- Indication: For acute blood loss anemia

## 2021-02-11 NOTE — DISCHARGE NOTE OB - PATIENT PORTAL LINK FT
You can access the FollowMyHealth Patient Portal offered by [facility] by registering at the following website: [email or web address]. By joining [facility]’s FollowMyHealth portal, you will also be able to view your health information using other applications (apps) compatible with our system.

## 2021-02-11 NOTE — DISCHARGE NOTE OB - CARE PLAN
Principal Discharge DX:	 delivery delivered  Goal:	rapid recovery  Assessment and plan of treatment:	Patient can transition to regular activity level and continue regular diet. Patient should follow up with Dr. Rivera's office to schedule post-op follow up and incision check visits. Patient should contact doctor earlier should she develop persistent/increased vaginal bleeding or fever.   Principal Discharge DX:	 delivery delivered  Goal:	rapid recovery  Assessment and plan of treatment:	Patient can transition to regular activity level and continue regular diet. Patient should follow up with Dr. Gongora's office to schedule post-op follow up and incision check visits. Patient should contact doctor earlier should she develop persistent/increased vaginal bleeding or fever.

## 2021-02-11 NOTE — DISCHARGE NOTE OB - PLAN OF CARE
rapid recovery Patient can transition to regular activity level and continue regular diet. Patient should follow up with Dr. Rivera's office to schedule post-op follow up and incision check visits. Patient should contact doctor earlier should she develop persistent/increased vaginal bleeding or fever. Patient can transition to regular activity level and continue regular diet. Patient should follow up with Dr. Gongora's office to schedule post-op follow up and incision check visits. Patient should contact doctor earlier should she develop persistent/increased vaginal bleeding or fever.

## 2021-02-11 NOTE — PROGRESS NOTE ADULT - ASSESSMENT
46y year old  now POD#2 s/p repeat  section and bilateral salpingectomy at 39wks gestation, in stable condition.   - Post partum, post operative acute blood loss anemia: VSS, asymptomatic, will start PO iron and vitamin C   - continue routine post partum and post op care  - continue to encourage ambulation, breast feeding, and adequate PO intake   - continue pain medication PRN   - anticipated discharge tomorrow   
46y year old  now POD#4 s/p repeat  section and bilateral salpingectomy at 39wks gestation, in stable condition.   - Post partum, post operative acute blood loss anemia: VSS, asymptomatic, continue PO iron and vitamin C   - meeting all post partum and post operative milestones   - ready for discharge today   
46y year old  now POD#1 s/p repeat  section with bilateral salpingectomy at 39wks gestation, in stable condition.   - AM labs pending   - continue routine post partum and post op care  - continue to encourage ambulation, breast feeding, and adequate PO intake   - continue pain medication PRN   - awaiting spontaneous void

## 2021-02-14 DIAGNOSIS — D62 ACUTE POSTHEMORRHAGIC ANEMIA: ICD-10-CM

## 2021-02-14 DIAGNOSIS — O34.211 MATERNAL CARE FOR LOW TRANSVERSE SCAR FROM PREVIOUS CESAREAN DELIVERY: ICD-10-CM

## 2021-02-14 DIAGNOSIS — Z3A.39 39 WEEKS GESTATION OF PREGNANCY: ICD-10-CM

## 2021-02-14 DIAGNOSIS — R14.0 ABDOMINAL DISTENSION (GASEOUS): ICD-10-CM

## 2021-02-15 ENCOUNTER — APPOINTMENT (OUTPATIENT)
Dept: OBGYN | Facility: CLINIC | Age: 46
End: 2021-02-15
Payer: COMMERCIAL

## 2021-02-15 VITALS
DIASTOLIC BLOOD PRESSURE: 84 MMHG | TEMPERATURE: 97 F | WEIGHT: 162 LBS | RESPIRATION RATE: 16 BRPM | HEIGHT: 64 IN | SYSTOLIC BLOOD PRESSURE: 134 MMHG | BODY MASS INDEX: 27.66 KG/M2

## 2021-02-15 PROCEDURE — 0503F POSTPARTUM CARE VISIT: CPT

## 2021-02-15 NOTE — HISTORY OF PRESENT ILLNESS
[Postpartum Follow Up] : postpartum follow up [Complications:___] : no complications [Primary C/S] : delivered by  section [Breastfeeding] : not currently nursing [S/Sx PP Depression] : no signs/symptoms of postpartum depression [Clean/Dry/Intact] : clean, dry and intact [Erythema] : not erythematous [Back to Normal] : is still enlarged [___ wks] : is [unfilled] weeks in size [Mild] : mild vaginal bleeding [Normal] : the vagina was normal [Cervix Sample Taken] : cervical sample not taken for a Pap smear [Not Done] : Examination of breasts not done [Doing Well] : is doing well [No Sign of Infection] : is showing no signs of infection [Excellent Pain Control] : has excellent pain control [Sutures Removed] : sutures were removed [Staples Removed] : staples were removed [None] : None [de-identified] : followup 4 weeks

## 2021-02-23 ENCOUNTER — APPOINTMENT (OUTPATIENT)
Dept: OBGYN | Facility: CLINIC | Age: 46
End: 2021-02-23
Payer: COMMERCIAL

## 2021-02-23 DIAGNOSIS — Z98.890 OTHER SPECIFIED POSTPROCEDURAL STATES: ICD-10-CM

## 2021-02-23 DIAGNOSIS — D64.9 ANEMIA, UNSPECIFIED: ICD-10-CM

## 2021-02-23 LAB — HEMOGLOBIN: 10.7

## 2021-02-23 PROCEDURE — 0503F POSTPARTUM CARE VISIT: CPT

## 2021-02-23 RX ORDER — CEPHALEXIN 250 MG/1
250 CAPSULE ORAL
Qty: 10 | Refills: 0 | Status: ACTIVE | COMMUNITY
Start: 2021-02-23 | End: 1900-01-01

## 2021-02-23 NOTE — HISTORY OF PRESENT ILLNESS
[Postpartum Follow Up] : postpartum follow up [Repeat C/S] : delivered by  section (repeat) [Female] : Delivery History: baby girl [Breastfeeding] : currently nursing [Incisional Drainage] : incisional drainage [Erythema] : erythematous [Swelling] : swollen [Dehiscence] : dehisced [Normal] : the vagina was normal [Not Done] : Examination of breasts not done [Doing Well] : is doing well [No Sign of Infection] : is showing no signs of infection [Excellent Pain Control] : has excellent pain control [None] : None [No [place]] : to avoid sexual intercourse [Limited ADLs] : to participate in activities of daily living with limitations [Limited Work] : to work with limitations [Limited Housework] : to do housework with limitations [No Sports] : not to participate in sports [Complications:___] : no complications [S/Sx PP Depression] : no signs/symptoms of postpartum depression [Healed] : not healed [Back to Normal] : is still enlarged [Cervix Sample Taken] : cervical sample not taken for a Pap smear [de-identified] : SLIGHT DEHISCENCE NOTED ON LEFT SIDED INICISION AREA. CLEANED AND STERI STRIPS APPLIED. PT ADVISED TO CONTINUE TO MONITOR. RX KEFLEX SENT TO PHARMACY WITH INSTRUCTIONS ON USE. MD RODRIGUEZ IN TO ASSESS

## 2021-03-15 ENCOUNTER — APPOINTMENT (OUTPATIENT)
Dept: OBGYN | Facility: CLINIC | Age: 46
End: 2021-03-15
Payer: COMMERCIAL

## 2021-03-15 VITALS
WEIGHT: 151 LBS | SYSTOLIC BLOOD PRESSURE: 110 MMHG | HEIGHT: 64 IN | BODY MASS INDEX: 25.78 KG/M2 | DIASTOLIC BLOOD PRESSURE: 70 MMHG | HEART RATE: 78 BPM | RESPIRATION RATE: 16 BRPM

## 2021-03-15 PROCEDURE — 0503F POSTPARTUM CARE VISIT: CPT

## 2021-03-15 NOTE — HISTORY OF PRESENT ILLNESS
[Postpartum Follow Up] : postpartum follow up [Complications:___] : no complications [Primary C/S] : delivered by  section [Breastfeeding] : not currently nursing [S/Sx PP Depression] : no signs/symptoms of postpartum depression [Erythema] : not erythematous [Healed] : healed [Back to Normal] : is back to normal in size [Normal] : the vagina was normal [Cervix Sample Taken] : cervical sample not taken for a Pap smear [Not Done] : Examination of breasts not done [Doing Well] : is doing well [No Sign of Infection] : is showing no signs of infection [Excellent Pain Control] : has excellent pain control [None] : None [de-identified] : FOLLOWUP IN 3 MONTHS FOR ANNUAL

## 2021-06-14 ENCOUNTER — APPOINTMENT (OUTPATIENT)
Dept: OBGYN | Facility: CLINIC | Age: 46
End: 2021-06-14
Payer: COMMERCIAL

## 2021-06-14 ENCOUNTER — NON-APPOINTMENT (OUTPATIENT)
Age: 46
End: 2021-06-14

## 2021-06-14 VITALS
WEIGHT: 152 LBS | TEMPERATURE: 97.7 F | SYSTOLIC BLOOD PRESSURE: 110 MMHG | HEIGHT: 64 IN | DIASTOLIC BLOOD PRESSURE: 70 MMHG | BODY MASS INDEX: 25.95 KG/M2

## 2021-06-14 DIAGNOSIS — Z01.419 ENCOUNTER FOR GYNECOLOGICAL EXAMINATION (GENERAL) (ROUTINE) W/OUT ABNORMAL FINDINGS: ICD-10-CM

## 2021-06-14 PROCEDURE — 99396 PREV VISIT EST AGE 40-64: CPT

## 2021-06-14 PROCEDURE — 99072 ADDL SUPL MATRL&STAF TM PHE: CPT

## 2021-06-14 RX ORDER — IBUPROFEN 600 MG/1
600 TABLET, FILM COATED ORAL
Qty: 60 | Refills: 0 | Status: ACTIVE | COMMUNITY
Start: 2021-02-11

## 2021-06-14 RX ORDER — ACETAMINOPHEN 325 MG/1
325 TABLET ORAL
Qty: 80 | Refills: 0 | Status: ACTIVE | COMMUNITY
Start: 2021-02-11

## 2021-06-14 RX ORDER — UBIDECARENONE 200 MG
500 CAPSULE ORAL
Qty: 30 | Refills: 0 | Status: ACTIVE | COMMUNITY
Start: 2021-02-11

## 2021-06-14 RX ORDER — POLYETHYLENE GLYCOL 3350 17 G/17G
17 POWDER, FOR SOLUTION ORAL
Qty: 119 | Refills: 0 | Status: ACTIVE | COMMUNITY
Start: 2021-02-11

## 2021-06-14 RX ORDER — CHLORHEXIDINE GLUCONATE 4 %
325 (65 FE) LIQUID (ML) TOPICAL
Qty: 30 | Refills: 0 | Status: ACTIVE | COMMUNITY
Start: 2021-02-11

## 2021-06-14 RX ORDER — OXYCODONE 5 MG/1
5 TABLET ORAL
Qty: 12 | Refills: 0 | Status: ACTIVE | COMMUNITY
Start: 2021-02-11

## 2021-06-14 NOTE — PHYSICAL EXAM
[Appropriately responsive] : appropriately responsive [Alert] : alert [No Acute Distress] : no acute distress [No Lymphadenopathy] : no lymphadenopathy [Regular Rate Rhythm] : regular rate rhythm [No Murmurs] : no murmurs [Clear to Auscultation B/L] : clear to auscultation bilaterally [Soft] : soft [Non-tender] : non-tender [Non-distended] : non-distended [No HSM] : No HSM [No Lesions] : no lesions [No Mass] : no mass [Oriented x3] : oriented x3 [FreeTextEntry7] : INCISION HEALING WELL [Examination Of The Breasts] : a normal appearance [No Masses] : no breast masses were palpable [Labia Majora] : normal [Labia Minora] : normal [Normal] : normal [Uterine Adnexae] : normal

## 2021-06-14 NOTE — COUNSELING
[Nutrition/ Exercise/ Weight Management] : nutrition, exercise, weight management [Body Image] : body image [Breast Self Exam] : breast self exam [Vaccines] : vaccines [Influenza Vaccine] : influenza vaccine

## 2021-06-14 NOTE — PLAN
[FreeTextEntry1] : PATIENT PRESENTS FOR ANNUAL VISIT. PATIENT DOING WELL.. SELF BREAST EXAM RECOMMENDED. DIET AND EXERCISE DISCUSSED. PATIENT ADVISED TO CONTACT WITH ANY CHANGES IN HEALTH. DISCUSSED CONTRACEPTION. HAD BTL. MAMMO WHEN FINISHED BREAST FEEDING

## 2025-02-27 NOTE — PATIENT PROFILE OB - HEIGHT IN FEET
Problem: Pain  Goal: Verbalizes/displays adequate comfort level or baseline comfort level  Outcome: Progressing     Problem: Wound:  Goal: Will show signs of wound healing; wound closure and no evidence of infection  Description: Will show signs of wound healing; wound closure and no evidence of infection  Outcome: Progressing     
5